# Patient Record
Sex: MALE | Race: WHITE | Employment: OTHER | URBAN - METROPOLITAN AREA
[De-identification: names, ages, dates, MRNs, and addresses within clinical notes are randomized per-mention and may not be internally consistent; named-entity substitution may affect disease eponyms.]

---

## 2021-12-06 ENCOUNTER — HOSPITAL ENCOUNTER (OUTPATIENT)
Dept: NUTRITION | Age: 76
Discharge: HOME | End: 2021-12-06
Payer: MEDICARE

## 2021-12-06 PROCEDURE — 97802 MEDICAL NUTRITION INDIV IN: CPT

## 2021-12-06 NOTE — PROGRESS NOTES
..  [address] [place] [address]. [facility], [address] [place] [address]   Nutrition Assessment  Medical Nutrition Therapy   Outpatient Initial Evaluation         Patient Name: Angie Lofton : 1945   Treatment Diagnosis: Type 2 DM   Referral Source: Jose Leyva MD [facility]): 2021     Gender: male Age: 78 y.o. Ht: 70 in Wt: 186  lb  kg   BMI: 26.7 BMR   Male 1589 BMR Female      Past Medical History:  Glaucoma, acute pancreatitis, cholecystectomy, hyperlipidemia, GERD     Pertinent Medications:   Amlodipine, Norvasc, Atorvastatin 40 mg, Glipizide XL 10mg, Lantus 30 units at HS, Prilosec     Biochemical Data:   21: A1C 8.9%     Assessment:    The patient is here for diabetes education and he is accompanied by his wife today. I had seen this couple at [facility] in 2019. The patient stated that he had pancreatitis while on vacation this summer and he had his gallbladder removed. At the time of his illness, he was taking Humalog with meals. Humalog has since been d/c'ed and he is just taking Lantus at HS and Glipizide with breakfast. The patient's wife does all of the cooking. They would like a review of a healthy diet that the patient should follow. The patient works part time (3 days per week). He says he \"cheats\" when he's away from home. Food & Nutrition:   The patient did not provide a complete diet hx today. The couple had many questions related to nutrition. He says he eats 3 meals per day. The patient says he tries to eat Jennifer Flack bowls on the days that he works. Sometimes he will eat a banana and Cheerios or oatmeal. Lunch: did not tell me. Dinner: whatever the wife cooks One example is clam chowder with potatoes and cauliflower wings. The patient admits he loves potato chips.      Estimate Needs   Calories:  1600 Protein: 100 Carbs: 180 Fat: 53   Kcal/day  g/day  g/day  g/day        percent: 25  45  30 Nutrition Diagnosis   Overweight related to excessive energy intake as evidenced by BMI of 26.7    Altered nutrition related lab values related to type 2 DM as evidenced by A1C 8.9%         Nutrition Intervention &  Education: Educated patient and pt's wife on diabetes, weight loss diet with plate method guidelines. Encouraged the patient to eat at least 3 times per day, spacing meals no more than 4-5 hours apart. Discussed that 1/2 the plate should include non-starchy vegetables, 1/4 plate should be lean protein, and 1/4 of plate should be carbohydrate. Provided the patient with list of macro-nutrient sources (CHO, pro, and fat) and appropriate amounts of CHO to be consumed at each meal and snack. Encouraged the patient to try using measuring cups or Calorie Pepe Clam gloria to familiarize with appropriate serving sizes. Suggested the patient include protein source with all meals and snacks. Include more non-starchy vegetables and 2 fruit servings per day (eat a variety). Don't drink calories: avoid fruit juice, regular sodas, sweet tea, Gatorade. Eliminate/decrease fast food consumption. We discussed the importance of timing of meals. Discussed importance of 150 minutes per week physical activity.      Handouts Provided: [x]  Carbohydrates  [x]  Protein  [x]  Non-starchy Vegetables  []  Food Label  [x]  Meal and Snack Ideas  []  Food Journals [x]  Diabetes  []  Cholesterol  []  Sodium  [x]  Gen Nutr Guidelines  []  SBGM Guidelines  [x]  Others: My Healthy Plate   Information Reviewed with: Patient and patient's wife   Readiness to Change Stage: []  Pre-contemplative    []  Contemplative  []  Preparation               [x]  Action                  []  Maintenance   Potential Barriers to Learning: []  Decline in memory    []  Language barrier   []  Other:  []  Emotional                  []  Limited mobility  []  Lack of motivation     [] Vision, hearing or cognitive impairment   Expected Compliance: Good- the patient has a good support system. He is hopeful that his A1C will be lower at his next blood draw (he thinks it was high due to recent illness). The patient is willing to make changes to his lifestyle to improve his health. Nutritional Goal - To promote lifestyle changes to result in:    [x]  Weight loss  [x]  Improved diabetic control  []  Decreased cholesterol levels  []  Decreased blood pressure  []  Weight maintenance []  Preventing any interactions associated with food allergies  []  Adequate weight gain toward goal weight  []  Other:        Patient Goals:   1. Consume 3 balanced meals per day, spacing meals every 4-5 hours  2. Follow plate method guidelines: controlled portions of carbs (60 g CHO per meal), lean protein, plenty of non-starchy vegetables  3. 64 oz water daily, no sugary drinks   4. Consistent exercise: try walking after meals.  Recommend 30 minutes of cardio, at least 5 days per week        Dietitian Signature: Leanne Pressley RD,[facility] Date: 12/6/2021   Follow-up: January 31, 2022 @ 11:30 AM Time: 4:26 PM

## 2022-01-31 ENCOUNTER — HOSPITAL ENCOUNTER (OUTPATIENT)
Dept: NUTRITION | Age: 77
Discharge: HOME | End: 2022-01-31
Payer: MEDICARE

## 2022-01-31 PROCEDURE — 97803 MED NUTRITION INDIV SUBSEQ: CPT

## 2022-01-31 NOTE — PROGRESS NOTES
.. NUTRITION - FOLLOW-UP TREATMENT NOTE  Patient Name: Rebecca Teague         Date: 2022  : 1945    YES/NO Patient  Verified  Diagnosis: Type 2 DM   In time:   11:30            Out time:   12:30   Total Treatment Time (min):   60 minutes     SUBJECTIVE/ASSESSMENT    Changes in medication or medical history? Any new allergies, surgeries or procedures? NO   If yes, update Summary List   The patient is here with his wife today for a follow up appointment. The wife admits that they received the information from SHAHEEN in December but they haven't been following any of the dietary advice due to the holidays. \"We haven't even looked at the education materials. \" The patient checks his blood sugars twice a day (at fasting and before bed). He was given a [place] Romi but he hasn't started using it yet. The patient says his blood sugars are \"mostly good\". He says he has had a few highs (above 200) before bed but this is usually if he eats dinner late or decides to eat a snack while watching tv. He admits he likes a snack mix (beer nuts) which is very high in carbs and he has not been measuring portions. The patient's wife has many questions and needs constant assurances that the foods she is feeding her  are good. She follows Weight [employer] and she says she was trying to eliminate carbs from the patient's diet before the couple met with [facility]HEEN in December. The patient said he dislikes the low carb breads, \"They taste like cardboard. \" He also told me that he \"cheats\" when he is away from his wife because he is hungry. The wife prepares all of the meals. Three nights per week when the patient works, the couple eats dinner after 7 PM. The wife is looking for easy meals. They are buying frozen vegetables but they prefer the breaded ones. (hidden carbs)  Blood sugars reported to SHAHEEN (The patient writes them down.  He did not show me his meter today.)  Fasting: ranging from   Before bed: 124-170 \"A few\" over 200  The patient says he has not had any blood sugars under 70  The patient will go back to see Dr. Jared Lo March 14, 2022. He is not exercising. The patient has a treadmill in his house but he says he wants to relax after dinner. He doesn't like exercising inside. Current Wt: 185 (stated) Previous Wt: 186 Wt Change: -1#     Achievement of Goals: The patient has not been working on goals. Will start over. Patient Education:  [x]  Review current plan with patient   [x]  Other: Answered the patient's wife's many questions. Reviewed carb counting and portion sizes. Recommend no more than 60 g carbs per meal. Follow plate method (include lean protein and non-starchy vegetables at meals). Discussed hidden carbs. Discussed acceptable blood sugar ranges and how to combat hyperglycemia. Stressed importance of consistent exercise. Handouts/  Information Provided: []  Carbohydrates  []  Protein  []  Fiber  []  Serving Sizes  []  Fluids  []  General guidelines []  Diabetes  []  Cholesterol  []  Sodium  []  SBGM  []  Food Journals  [x]  Others: [employer] carb counting booklet      New Patient Goals: 1. Portion control your carb choices; 60 g carbs per meal. Read labels and adhere to portion sizes. Use measuring cups. 2. Space meals every 4-5 hours. 3. On days that you work and won't eat dinner until later, eat a snack  with carb(s) and protein. Don't eat carbs alone. The protein will make you feel full. See snack list.  4. 64 oz water daily. No sugary drinks. 5. Try a short walk or bike ride after dinner. Even 10-15 minutes is better than nothing.      PLAN    [x]  Continue on current plan []  Follow-up PRN   []  Discharge due to :    [x]  Next appt: March 21, 2022 @ 11:30 AM     Dietitian: Linh Skinner RD, [facility]    Date: 1/31/2022 Time: 12:50 PM

## 2022-03-21 ENCOUNTER — HOSPITAL ENCOUNTER (OUTPATIENT)
Dept: NUTRITION | Age: 77
End: 2022-03-21
Payer: MEDICARE

## 2022-03-28 ENCOUNTER — HOSPITAL ENCOUNTER (OUTPATIENT)
Dept: NUTRITION | Age: 77
Discharge: HOME | End: 2022-03-28
Payer: MEDICARE

## 2022-03-28 PROCEDURE — 97803 MED NUTRITION INDIV SUBSEQ: CPT

## 2022-03-28 NOTE — PROGRESS NOTES
.. NUTRITION - FOLLOW-UP TREATMENT NOTE  Patient Name: Patience Romero         Date: 3/28/2022  : 1945    YES/NO Patient  Verified  Diagnosis: Type 2 DM   In time:  11:30            Out time:   12:00   Total Treatment Time (min):   30 minutes     SUBJECTIVE/ASSESSMENT    Changes in medication or medical history? Any new allergies, surgeries or procedures? NO   If yes, update Summary List   The patient is here with his wife for a follow up visit. He just saw Dr. Indira Griffin on 3/7/22. The patient's A1C is much improved. Currently 7.6%; down from 8.9% in 2021. There are no medication changes at this time. The patient declined taking Metformin d/t GI side effects. The patient is taking 35 units Lantus daily and 10 mg Glipizide with breakfast. He is now taking 81 mg ASA daily, as well. The patient's wife had a few questions about healthy eating during their upcoming vacation to [place] in April. The patient did not provide diet hx today. He says he is doing fine. \"I'm not perfect all the time. \" He will go back to see Dr. Indira Griffin again in August.  Goal weight: 185-190#   Current Wt: 191 (stated) Previous Wt: 185 Wt Change: +6     Achievement of Goals: 1. Portion control your carbs. Stick to 60 g carbs per meal. Use measuring cups. Sometimes met. In progress, continue  2. Space meals every 4-5 hours. Did not discuss. In progress, continue  3. On the days you won't eat dinner until later, eat a snack with carb(s) and protein. Did not discuss. Still appropriate. In progress, continue  4. 64 oz water daily. Met, in progress, continue  5. Try short walk or bike ride after dinner. Walking 1 mile 2-3 days per week. Partially met. In progress, continue     Patient Education:  [x]  Review current plan with patient   [x]  Other: Discuss portion sizes for carbs at dinner (use measuring cups for rice and pasta). Eat more vegetables or lean meat if you are still hungry. Watch the alcohol on vacation.  Go easy on the sweets (Stick to only one scoop or 1/2 cup of ice cream if you are going to eat it. Make this an occasional treat.)   Handouts/  Information Provided: []  Carbohydrates  []  Protein  []  Fiber  []  Serving Sizes  []  Fluids  []  General guidelines []  Diabetes  []  Cholesterol  []  Sodium  []  SBGM  []  Food Journals  []  Others:      New Patient Goals: 1. Pay attention to the portion sizes of carbs. Measure out 1 cup of cooked rice or pasta and that is it for the meal.  2. 64 oz water daily  3. If you need more food at a meal, eat more non-starchy vegetables or lean meat rather than going back for seconds of carb choices. The fiber in the vegetables will make you feel full. 4. Be consistent with exercise. Set a goal to walk daily. Try short bursts of energy after meals. A 10-15 minute walk is fine (it does not have to be a mile). PLAN    [x]  Continue on current plan []  Follow-up PRN   []  Discharge due to :    [x]  Next appt: Pt would like to come back in the fall. He will call for an appointment because it's too far out right now. Patient and wife have RD contact info for questions in between visits.      Dietitian: Olman Mendez RD, [facility]    Date: 3/28/2022 Time: 12:21 PM